# Patient Record
Sex: MALE | Race: WHITE
[De-identification: names, ages, dates, MRNs, and addresses within clinical notes are randomized per-mention and may not be internally consistent; named-entity substitution may affect disease eponyms.]

---

## 2019-10-18 ENCOUNTER — HOSPITAL ENCOUNTER (EMERGENCY)
Dept: HOSPITAL 62 - ER | Age: 64
End: 2019-10-18
Payer: SELF-PAY

## 2019-10-18 VITALS — DIASTOLIC BLOOD PRESSURE: 50 MMHG | SYSTOLIC BLOOD PRESSURE: 74 MMHG

## 2019-10-18 DIAGNOSIS — I46.9: Primary | ICD-10-CM

## 2019-10-18 PROCEDURE — 82962 GLUCOSE BLOOD TEST: CPT

## 2019-10-18 PROCEDURE — 31500 INSERT EMERGENCY AIRWAY: CPT

## 2019-10-18 PROCEDURE — 93005 ELECTROCARDIOGRAM TRACING: CPT

## 2019-10-18 PROCEDURE — 93010 ELECTROCARDIOGRAM REPORT: CPT

## 2019-10-18 PROCEDURE — 99285 EMERGENCY DEPT VISIT HI MDM: CPT

## 2019-10-18 NOTE — EKG REPORT
SEVERITY:- ABNORMAL ECG -

SINUS RHYTHM

FIRST DEGREE AV BLOCK

IVCD, CONSIDER ATYPICAL RBBB

PROBABLE INFERIOR INFARCT, AGE INDETERMINATE

ST DEPRESSION, CONSIDER ISCHEMIA, LAT LEADS

:

Confirmed by: Papo Pendleton 18-Oct-2019 07:21:52

## 2019-10-18 NOTE — ER DOCUMENT REPORT
ED Resuscitation





- General


Chief Complaint: Unresponsive


Stated Complaint: UNRESPONSIVE


Time Seen by Provider: 10/18/19 05:55


Mode of Arrival: Medic


Information source: Emergency Med Personnel





- \[facility]\""


Witnessed arrest: No


Bystander CPR?: No


Onset: Just prior to arrival


Notes: 





This is a 63[place]year[place] [employer] and apparently was found

down unknown amount of time he ate when EMS arrived apparently he was in 

[place] personnel there had started CPR EMS when they took over apparently had 

given in the field a total of 7 epinephrine have to return of spontaneous 

circulations patient was also given 2 of Narcan and atropine when he came back 

for heart rate of 40 when he arrived at 525 he did have pulses bilaterally 

however patient had a gel S airway in place there were no breath sounds heard on

the left side of very little breath sounds on the right therefore CPR was 

continued and patient was then intubated by myself due to a faulty light on the 

scope first attempt apparently was not successful patient then while after 

changing to handle the 7.5 ET tube was placed at 23 cm at the gum good breath 

sounds are bilaterally no sounds over the stomach CPR when he initially got here

was continued he was given 1 of epinephrine also 1 a bicarb CPR was continued 

throughout.  Patient was given a total of 4 of epinephrine IV.  NG tube was 

placed to decompress the stomach as it was thought due to the airway of the 

stomach with air.  Got up without a pulse or a blood pressure for 17 minutes 

incompatible with life and continued in asystole.  Using the ultrasound there 

was no left ventricle movement.  Therefore all efforts were ceased at 5:50 AM.  

This will be an ME case.  There is no family available to inform.





- Related Data


Allergies/Adverse Reactions: 


                                        





Unable to Assess Allergy (Unverified 10/18/19 06:11)


   











Past Medical History





- Social History


Smoking Status: Unknown if Ever Smoked


Family History: None





Course





- Laboratory


Laboratory results interpreted by me: 


                                        











  10/18/19





  05:37


 


POC Glucose  407 H*














Procedures





- Intubation


  ** Orotracheal


Airway evaluation: Copious secretions, Large tongue


Mallampati Classification: Class 3


Medications: Lidocaine, Atropine, Etomidate, Succinylcholine, Versed, Fentanyl, 

Ketamine, Diprivan, Pancuronium, Vecuronium, Other


Intubation method: Orotracheal


Blade type: Jeancarlos


Blade size: 3


Equipment used: Other - laryngoscope with #4 mac blade


ETT size: 7.5


ETT secured at: Lips


ETT secured at (cm): 23


Breath Sounds after Intubation: Equal - No sounds over stomach.





Discharge





- Discharge


Clinical Impression: 


 Cardiopulmonary arrest





Disposition: